# Patient Record
Sex: FEMALE | URBAN - METROPOLITAN AREA
[De-identification: names, ages, dates, MRNs, and addresses within clinical notes are randomized per-mention and may not be internally consistent; named-entity substitution may affect disease eponyms.]

---

## 2019-09-19 ENCOUNTER — APPOINTMENT (RX ONLY)
Dept: URBAN - METROPOLITAN AREA CLINIC 41 | Facility: CLINIC | Age: 64
Setting detail: DERMATOLOGY
End: 2019-09-19

## 2019-09-19 DIAGNOSIS — L90.8 OTHER ATROPHIC DISORDERS OF SKIN: ICD-10-CM

## 2019-09-19 PROCEDURE — ? FILLERS

## 2019-09-19 PROCEDURE — ? COSMETIC CONSULTATION: FILLERS

## 2019-09-19 PROCEDURE — ? TREATMENT REGIMEN

## 2019-09-19 NOTE — PROCEDURE: FILLERS
Additional Area 3 Volume In Cc: 0
Filler: Juvederm Volbella XC
Irene Pike Syringe Price (Per 1.0 Cc- Numbers Only No Special Characters Or $): 0.00
Detail Level: Zone
Include Cannula Information In Note?: No
Lot #: [id number]
Map Statment: See Attach Map for Complete Details
Juvederm Danielle  Syringe Price (Per 1.0 Cc- Numbers Only No Special Characters Or $): 650.00
Expiration Date (Month Year): 09-
Juvederm Volbella Xc Syringe Price (Per 0.55 Cc- Numbers Only No Special Characters Or $): 450.00
Juvederm Voluma Xc Syringe Price (Per 1.0 Cc- Numbers Only No Special Characters Or $): 850.00
Anesthesia Volume In Cc: 0.5
Consent: Written consent obtained. Risks include but not limited to bruising, beading, irregular texture, ulceration, infection, allergic reaction, scar formation, incomplete augmentation, temporary nature, procedural pain.
Post-Care Instructions: Patient instructed to apply ice to reduce swelling.
Additional Area 1 Location: pink lip filler
Additional Anesthesia Volume In Cc: 6
Additional Area 1 Volume In Cc: 1.1
Pricing Information: This plan will add up the cumulative amount of filler you document and will bill based on the unit price noted below. For example if you inject 0.9 ccs of Restylane it will bill for one unit. If you inject 1.3 ccs it will bill for two units.

## 2019-09-19 NOTE — PROCEDURE: TREATMENT REGIMEN
Detail Level: Zone
Plan: Discussed 2-3 vollure and 2 Volbella in the lips.\\n\\nPatient was given a special of $300 per 0.55cc syringe of vobella today.  Special ends on 10/01/2019

## 2019-09-30 ENCOUNTER — APPOINTMENT (RX ONLY)
Dept: URBAN - METROPOLITAN AREA CLINIC 41 | Facility: CLINIC | Age: 64
Setting detail: DERMATOLOGY
End: 2019-09-30

## 2019-09-30 DIAGNOSIS — L90.8 OTHER ATROPHIC DISORDERS OF SKIN: ICD-10-CM

## 2019-09-30 PROCEDURE — ? TREATMENT REGIMEN

## 2019-09-30 PROCEDURE — ? FILLERS

## 2019-09-30 PROCEDURE — ? COSMETIC CONSULTATION: FILLERS

## 2019-09-30 NOTE — PROCEDURE: TREATMENT REGIMEN
Plan: Patient satisfied with previous filler. Patient is receiving more filler today.  Patient is receiving vobella 0.55cc at special pricing of $300.
Detail Level: Zone

## 2019-09-30 NOTE — PROCEDURE: FILLERS
Jawline Filler Volume In Cc: 0
Prevelle Silk Syringe Price (Per 1.0 Cc- Numbers Only No Special Characters Or $): 0.00
Additional Anesthesia Volume In Cc: 6
Pricing Information: This plan will add up the cumulative amount of filler you document and will bill based on the unit price noted below. For example if you inject 0.9 ccs of Restylane it will bill for one unit. If you inject 1.3 ccs it will bill for two units.
Additional Area 1 Location: pink lips
Detail Level: Zone
Additional Area 1 Volume In Cc: 0.5
Zarate Override (Please Add The Total Price Without Any Special Characters Or $): 300.00
Restylane-L Syringe Price (Per 1.0 Cc- Numbers Only No Special Characters Or $): 450.00
Filler: Juvederm Volbella XC
Include Cannula Information In Note?: No
Map Statment: See Attach Map for Complete Details
Lot #: [id number]
Juvederm Danielle  Syringe Price (Per 1.0 Cc- Numbers Only No Special Characters Or $): 650.00
Juvederm Voluma Xc Syringe Price (Per 1.0 Cc- Numbers Only No Special Characters Or $): 850.00
Expiration Date (Month Year): 09/2019
Consent: Written consent obtained. Risks include but not limited to bruising, beading, irregular texture, ulceration, infection, allergic reaction, scar formation, incomplete augmentation, temporary nature, procedural pain.
Post-Care Instructions: Patient instructed to apply ice to reduce swelling.

## 2019-10-24 ENCOUNTER — APPOINTMENT (RX ONLY)
Dept: URBAN - METROPOLITAN AREA CLINIC 41 | Facility: CLINIC | Age: 64
Setting detail: DERMATOLOGY
End: 2019-10-24

## 2019-10-24 DIAGNOSIS — L90.8 OTHER ATROPHIC DISORDERS OF SKIN: ICD-10-CM

## 2019-10-24 PROCEDURE — ? MEDICAL CONSULTATION: FILLERS

## 2019-10-24 PROCEDURE — ? TREATMENT REGIMEN

## 2019-10-24 ASSESSMENT — LOCATION DETAILED DESCRIPTION DERM
LOCATION DETAILED: RIGHT SUPERIOR VERMILION LIP
LOCATION DETAILED: RIGHT INFERIOR VERMILION LIP

## 2019-10-24 ASSESSMENT — LOCATION ZONE DERM: LOCATION ZONE: LIP

## 2019-10-24 ASSESSMENT — LOCATION SIMPLE DESCRIPTION DERM: LOCATION SIMPLE: RIGHT LIP

## 2020-07-16 ENCOUNTER — APPOINTMENT (RX ONLY)
Dept: URBAN - METROPOLITAN AREA CLINIC 41 | Facility: CLINIC | Age: 65
Setting detail: DERMATOLOGY
End: 2020-07-16

## 2020-07-16 DIAGNOSIS — L90.8 OTHER ATROPHIC DISORDERS OF SKIN: ICD-10-CM

## 2020-07-16 DIAGNOSIS — L98.8 OTHER SPECIFIED DISORDERS OF THE SKIN AND SUBCUTANEOUS TISSUE: ICD-10-CM

## 2020-07-16 DIAGNOSIS — L81.4 OTHER MELANIN HYPERPIGMENTATION: ICD-10-CM

## 2020-07-16 PROCEDURE — ? MEDICAL CONSULTATION: DYNAMIC RHYTIDES

## 2020-07-16 PROCEDURE — ? COUNSELING

## 2020-07-16 PROCEDURE — ? FILLERS

## 2020-07-16 PROCEDURE — ? TREATMENT REGIMEN

## 2020-07-16 PROCEDURE — ? MEDICAL CONSULTATION: FILLERS

## 2020-07-16 ASSESSMENT — LOCATION DETAILED DESCRIPTION DERM
LOCATION DETAILED: LEFT MEDIAL BUCCAL CHEEK
LOCATION DETAILED: RIGHT MEDIAL BUCCAL CHEEK
LOCATION DETAILED: SUPERIOR MID FOREHEAD
LOCATION DETAILED: RIGHT INFERIOR CENTRAL MALAR CHEEK
LOCATION DETAILED: LEFT INFERIOR CENTRAL MALAR CHEEK

## 2020-07-16 ASSESSMENT — LOCATION SIMPLE DESCRIPTION DERM
LOCATION SIMPLE: SUPERIOR FOREHEAD
LOCATION SIMPLE: RIGHT CHEEK
LOCATION SIMPLE: LEFT CHEEK

## 2020-07-16 ASSESSMENT — LOCATION ZONE DERM: LOCATION ZONE: FACE

## 2020-07-16 NOTE — PROCEDURE: FILLERS
Dorsal Hands Filler  Volume In Cc: 0
Human Collagen Syringe Price (Per 1.0 Cc- Numbers Only No Special Characters Or $): 0.00
Additional Area 1 Location: Milagros-oral
Restylane-L Syringe Price (Per 1.0 Cc- Numbers Only No Special Characters Or $): 450.00
Additional Anesthesia Volume In Cc: 6
Additional Area 2 Location: Mental Crease
Anesthesia Volume In Cc: 1
Topical Anesthesia?: BLT ointment (benzocaine 20%, lidocaine 10%, tetracaine 10%)
Irene Pike Syringe Price (Per 1.0 Cc- Numbers Only No Special Characters Or $): 850.00
Additional Area 2 Volume In Cc: 0.5
Irene Dow Syringe Price (Per 1.0 Cc- Numbers Only No Special Characters Or $): 650.00
Filler: Restylane Defyne
Include Cannula Size?: 27G
Filler: Juvederm Vollure XC
Consent: Written consent obtained. Risks include but not limited to bruising, beading, irregular texture, ulceration, infection, allergic reaction, scar formation, incomplete augmentation, temporary nature, procedural pain.
Marionette Lines Filler  Volume In Cc: 1.5
Post-Care Instructions: Patient instructed to apply ice to reduce swelling.
Detail Level: Zone
Lot #: [id number]
Pricing Information: This plan will add up the cumulative amount of filler you document and will bill based on the unit price noted below. For example if you inject 0.9 ccs of Restylane it will bill for one unit. If you inject 1.3 ccs it will bill for two units.
Expiration Date (Month Year): 04/30/2021
Use Map Statement For Sites (Optional): No
Map Statment: See Attach Map for Complete Details
Lot #: [id number]
Expiration Date (Month Year): 12/17/2020
Anesthesia Type: 1% lidocaine with 1:200,000 epinephrine and a 1:10 solution of 8.4% sodium bicarbonate
Include Cannula Information In Note?: Yes

## 2020-07-16 NOTE — PROCEDURE: TREATMENT REGIMEN
Plan: Stressed the importance of sunscreen usage. Discussed bleaching creams in the fall/winter months. Also discussed laser treatment
Initiate Treatment: [employer] Discoloration Defense QAM
Detail Level: Zone
Plan: Discussed getting 2 syringes of Defyne for the marionette lines and 2 syringes for the nasolabial folds. Also discussed 4 syringes of Lyft for the cheeks
Plan: Advised patient she is not a candidate for Botox due to the eyelid hooding

## 2020-07-30 ENCOUNTER — APPOINTMENT (RX ONLY)
Dept: URBAN - METROPOLITAN AREA CLINIC 41 | Facility: CLINIC | Age: 65
Setting detail: DERMATOLOGY
End: 2020-07-30

## 2020-07-30 DIAGNOSIS — L90.8 OTHER ATROPHIC DISORDERS OF SKIN: ICD-10-CM

## 2020-07-30 PROCEDURE — ? FILLERS

## 2020-07-30 PROCEDURE — ? TREATMENT REGIMEN

## 2020-07-30 PROCEDURE — ? MEDICAL CONSULTATION: FILLERS

## 2020-07-30 ASSESSMENT — LOCATION SIMPLE DESCRIPTION DERM
LOCATION SIMPLE: RIGHT CHEEK
LOCATION SIMPLE: LEFT CHEEK
LOCATION SIMPLE: LEFT LIP

## 2020-07-30 ASSESSMENT — LOCATION DETAILED DESCRIPTION DERM
LOCATION DETAILED: LEFT NASOLABIAL FOLD
LOCATION DETAILED: RIGHT INFERIOR MEDIAL MALAR CHEEK
LOCATION DETAILED: RIGHT MEDIAL BUCCAL CHEEK
LOCATION DETAILED: LEFT MEDIAL BUCCAL CHEEK

## 2020-07-30 ASSESSMENT — LOCATION ZONE DERM
LOCATION ZONE: FACE
LOCATION ZONE: LIP

## 2020-07-30 NOTE — PROCEDURE: FILLERS
Nasolabial Folds Filler Volume In Cc: 0
Juvederm Vollure Gera Syringe Price (Per 1.0 Cc- Numbers Only No Special Characters Or $): 500.00
Juvederm Xc Syringe Price (Per 1.0 Cc- Numbers Only No Special Characters Or $): 0.00
Juvederm Voluma Xc Syringe Price (Per 1.0 Cc- Numbers Only No Special Characters Or $): 850.00
Include Cannula Size?: 25G
Detail Level: Zone
Filler: Juvederm Vollure XC
Include Cannula Length?: 1.5 inch
Consent: Written consent obtained. Risks include but not limited to bruising, beading, irregular texture, ulceration, infection, allergic reaction, scar formation, incomplete augmentation, temporary nature, procedural pain.
Use Map Statement For Sites (Optional): No
Post-Care Instructions: Patient instructed to apply ice to reduce swelling.
Nasolabial Folds Filler Volume In Cc: 1
Map Statment: See Attach Map for Complete Details
Restylane-L Syringe Price (Per 1.0 Cc- Numbers Only No Special Characters Or $): 450.00
Pricing Information: This plan will add up the cumulative amount of filler you document and will bill based on the unit price noted below. For example if you inject 0.9 ccs of Restylane it will bill for one unit. If you inject 1.3 ccs it will bill for two units.
Lot #: [id number]
Irene Dow Syringe Price (Per 1.0 Cc- Numbers Only No Special Characters Or $): 650.00
Anesthesia Type: 1% lidocaine with 1:200,000 epinephrine and a 1:10 solution of 8.4% sodium bicarbonate
Expiration Date (Month Year): 12/17/2020
Anesthesia Volume In Cc: 0.5
Include Cannula Information In Note?: Yes
Additional Anesthesia Volume In Cc: 6

## 2020-07-30 NOTE — PROCEDURE: TREATMENT REGIMEN
Detail Level: Zone
Plan: Patient is s/p 2 syringes of Restylane Defyne and 2 syringes of Juvaderm Vollure and is happy with her results. Discussed doing 1-2 more syringe for full correction

## 2020-12-08 ENCOUNTER — APPOINTMENT (RX ONLY)
Dept: URBAN - METROPOLITAN AREA CLINIC 41 | Facility: CLINIC | Age: 65
Setting detail: DERMATOLOGY
End: 2020-12-08

## 2020-12-08 DIAGNOSIS — L81.4 OTHER MELANIN HYPERPIGMENTATION: ICD-10-CM

## 2020-12-08 DIAGNOSIS — L82.1 OTHER SEBORRHEIC KERATOSIS: ICD-10-CM

## 2020-12-08 DIAGNOSIS — D22 MELANOCYTIC NEVI: ICD-10-CM

## 2020-12-08 DIAGNOSIS — L82.0 INFLAMED SEBORRHEIC KERATOSIS: ICD-10-CM

## 2020-12-08 PROBLEM — D22.5 MELANOCYTIC NEVI OF TRUNK: Status: ACTIVE | Noted: 2020-12-08

## 2020-12-08 PROCEDURE — 99214 OFFICE O/P EST MOD 30 MIN: CPT | Mod: 25

## 2020-12-08 PROCEDURE — 17110 DESTRUCTION B9 LES UP TO 14: CPT

## 2020-12-08 PROCEDURE — ? LIQUID NITROGEN

## 2020-12-08 PROCEDURE — ? COUNSELING

## 2020-12-08 ASSESSMENT — LOCATION DETAILED DESCRIPTION DERM
LOCATION DETAILED: RIGHT VENTRAL DISTAL FOREARM
LOCATION DETAILED: LEFT PROXIMAL POSTERIOR UPPER ARM
LOCATION DETAILED: LEFT POSTERIOR SHOULDER
LOCATION DETAILED: RIGHT PROXIMAL POSTERIOR UPPER ARM
LOCATION DETAILED: RIGHT POSTERIOR SHOULDER
LOCATION DETAILED: LEFT ANTERIOR DISTAL UPPER ARM
LOCATION DETAILED: MIDDLE STERNUM
LOCATION DETAILED: RIGHT PROXIMAL PRETIBIAL REGION
LOCATION DETAILED: LEFT SUPERIOR MEDIAL MIDBACK
LOCATION DETAILED: RIGHT DISTAL DORSAL FOREARM
LOCATION DETAILED: RIGHT SUPERIOR MEDIAL MIDBACK
LOCATION DETAILED: RIGHT POPLITEAL SKIN
LOCATION DETAILED: LEFT POPLITEAL SKIN
LOCATION DETAILED: LEFT LATERAL DISTAL PRETIBIAL REGION
LOCATION DETAILED: LEFT CENTRAL MALAR CHEEK
LOCATION DETAILED: RIGHT ANTERIOR LATERAL DISTAL UPPER ARM
LOCATION DETAILED: RIGHT CENTRAL MALAR CHEEK

## 2020-12-08 ASSESSMENT — LOCATION SIMPLE DESCRIPTION DERM
LOCATION SIMPLE: LEFT SHOULDER
LOCATION SIMPLE: RIGHT PRETIBIAL REGION
LOCATION SIMPLE: RIGHT POPLITEAL SKIN
LOCATION SIMPLE: LEFT CHEEK
LOCATION SIMPLE: RIGHT SHOULDER
LOCATION SIMPLE: LEFT LOWER BACK
LOCATION SIMPLE: RIGHT LOWER BACK
LOCATION SIMPLE: LEFT POSTERIOR UPPER ARM
LOCATION SIMPLE: RIGHT FOREARM
LOCATION SIMPLE: RIGHT CHEEK
LOCATION SIMPLE: LEFT POPLITEAL SKIN
LOCATION SIMPLE: LEFT PRETIBIAL REGION
LOCATION SIMPLE: CHEST
LOCATION SIMPLE: RIGHT POSTERIOR UPPER ARM
LOCATION SIMPLE: LEFT UPPER ARM
LOCATION SIMPLE: RIGHT UPPER ARM

## 2020-12-08 ASSESSMENT — LOCATION ZONE DERM
LOCATION ZONE: FACE
LOCATION ZONE: LEG
LOCATION ZONE: ARM
LOCATION ZONE: TRUNK

## 2020-12-08 NOTE — HPI: SKIN LESION
Is This A New Presentation, Or A Follow-Up?: Skin Lesions
What Type Of Note Output Would You Prefer (Optional)?: Standard Output
How Severe Is Your Skin Lesion?: mild
Has Your Skin Lesion Been Treated?: not been treated
Which Family Member (Optional)?: Sister

## 2022-04-12 NOTE — PROCEDURE: COUNSELING
Wife calling w/ concern about Cholo's BP.  Has running higher since stopping amlodipine at OV 3/25/22.    4/10/22  165/91  4/11/22  187/108  4/12/22  192/106    BP Readings from Last 5 Encounters:   04/07/22 (!) 174/82   04/06/22 (!) 146/92   03/25/22 112/64   03/07/22 (!) 150/98   11/19/21 (!) 164/98     Asking for further recommendation regarding BP.   
Detail Level: Zone
Detail Level: Simple

## 2023-02-20 ENCOUNTER — APPOINTMENT (RX ONLY)
Dept: URBAN - METROPOLITAN AREA CLINIC 41 | Facility: CLINIC | Age: 68
Setting detail: DERMATOLOGY
End: 2023-02-20

## 2023-02-20 DIAGNOSIS — L81.4 OTHER MELANIN HYPERPIGMENTATION: ICD-10-CM

## 2023-02-20 DIAGNOSIS — I78.8 OTHER DISEASES OF CAPILLARIES: ICD-10-CM

## 2023-02-20 DIAGNOSIS — D22 MELANOCYTIC NEVI: ICD-10-CM

## 2023-02-20 DIAGNOSIS — L82.1 OTHER SEBORRHEIC KERATOSIS: ICD-10-CM

## 2023-02-20 DIAGNOSIS — D18.0 HEMANGIOMA: ICD-10-CM

## 2023-02-20 PROBLEM — D22.5 MELANOCYTIC NEVI OF TRUNK: Status: ACTIVE | Noted: 2023-02-20

## 2023-02-20 PROBLEM — D18.01 HEMANGIOMA OF SKIN AND SUBCUTANEOUS TISSUE: Status: ACTIVE | Noted: 2023-02-20

## 2023-02-20 PROCEDURE — ? SUNSCREEN RECOMMENDATIONS

## 2023-02-20 PROCEDURE — 99213 OFFICE O/P EST LOW 20 MIN: CPT

## 2023-02-20 PROCEDURE — ? COUNSELING

## 2023-02-20 ASSESSMENT — LOCATION DETAILED DESCRIPTION DERM
LOCATION DETAILED: RIGHT POSTERIOR SHOULDER
LOCATION DETAILED: LEFT CENTRAL MALAR CHEEK
LOCATION DETAILED: RIGHT POPLITEAL SKIN
LOCATION DETAILED: MIDDLE STERNUM
LOCATION DETAILED: LEFT SUPERIOR MEDIAL MIDBACK
LOCATION DETAILED: INFERIOR THORACIC SPINE
LOCATION DETAILED: RIGHT ANTERIOR LATERAL DISTAL UPPER ARM
LOCATION DETAILED: EPIGASTRIC SKIN
LOCATION DETAILED: LEFT POSTERIOR SHOULDER
LOCATION DETAILED: PERIUMBILICAL SKIN
LOCATION DETAILED: RIGHT LATERAL ABDOMEN
LOCATION DETAILED: LEFT ANTERIOR DISTAL UPPER ARM
LOCATION DETAILED: RIGHT SUPERIOR MEDIAL MIDBACK
LOCATION DETAILED: RIGHT MEDIAL MALAR CHEEK
LOCATION DETAILED: RIGHT PROXIMAL POSTERIOR UPPER ARM
LOCATION DETAILED: LEFT POPLITEAL SKIN
LOCATION DETAILED: LEFT PROXIMAL POSTERIOR UPPER ARM
LOCATION DETAILED: RIGHT CENTRAL MALAR CHEEK

## 2023-02-20 ASSESSMENT — LOCATION ZONE DERM
LOCATION ZONE: FACE
LOCATION ZONE: LEG
LOCATION ZONE: ARM
LOCATION ZONE: TRUNK

## 2023-02-20 ASSESSMENT — LOCATION SIMPLE DESCRIPTION DERM
LOCATION SIMPLE: RIGHT POPLITEAL SKIN
LOCATION SIMPLE: LEFT UPPER ARM
LOCATION SIMPLE: LEFT CHEEK
LOCATION SIMPLE: RIGHT LOWER BACK
LOCATION SIMPLE: ABDOMEN
LOCATION SIMPLE: CHEST
LOCATION SIMPLE: RIGHT SHOULDER
LOCATION SIMPLE: UPPER BACK
LOCATION SIMPLE: RIGHT POSTERIOR UPPER ARM
LOCATION SIMPLE: RIGHT CHEEK
LOCATION SIMPLE: LEFT SHOULDER
LOCATION SIMPLE: LEFT LOWER BACK
LOCATION SIMPLE: RIGHT UPPER ARM
LOCATION SIMPLE: LEFT POSTERIOR UPPER ARM
LOCATION SIMPLE: LEFT POPLITEAL SKIN

## 2023-02-20 NOTE — PROCEDURE: MIPS QUALITY
